# Patient Record
Sex: FEMALE | Race: WHITE | NOT HISPANIC OR LATINO | Employment: UNEMPLOYED | URBAN - METROPOLITAN AREA
[De-identification: names, ages, dates, MRNs, and addresses within clinical notes are randomized per-mention and may not be internally consistent; named-entity substitution may affect disease eponyms.]

---

## 2023-10-19 ENCOUNTER — HOSPITAL ENCOUNTER (OUTPATIENT)
Dept: RADIOLOGY | Facility: HOSPITAL | Age: 83
Discharge: HOME | End: 2023-10-19
Payer: MEDICARE

## 2023-10-19 DIAGNOSIS — R05.1 ACUTE COUGH: ICD-10-CM

## 2023-10-19 DIAGNOSIS — J47.0 BRONCHIECTASIS WITH ACUTE LOWER RESPIRATORY INFECTION (MULTI): ICD-10-CM

## 2023-10-19 PROCEDURE — 71046 X-RAY EXAM CHEST 2 VIEWS: CPT | Performed by: STUDENT IN AN ORGANIZED HEALTH CARE EDUCATION/TRAINING PROGRAM

## 2023-10-19 PROCEDURE — 71046 X-RAY EXAM CHEST 2 VIEWS: CPT | Mod: FY

## 2024-06-19 ENCOUNTER — APPOINTMENT (OUTPATIENT)
Dept: RADIOLOGY | Facility: HOSPITAL | Age: 84
End: 2024-06-19
Payer: MEDICARE

## 2024-06-19 ENCOUNTER — HOSPITAL ENCOUNTER (EMERGENCY)
Facility: HOSPITAL | Age: 84
Discharge: HOME | End: 2024-06-19
Payer: MEDICARE

## 2024-06-19 VITALS
HEIGHT: 62 IN | SYSTOLIC BLOOD PRESSURE: 155 MMHG | WEIGHT: 135 LBS | HEART RATE: 78 BPM | BODY MASS INDEX: 24.84 KG/M2 | TEMPERATURE: 97.5 F | DIASTOLIC BLOOD PRESSURE: 81 MMHG | OXYGEN SATURATION: 100 % | RESPIRATION RATE: 16 BRPM

## 2024-06-19 DIAGNOSIS — S49.91XA INJURY OF RIGHT SHOULDER, INITIAL ENCOUNTER: Primary | ICD-10-CM

## 2024-06-19 DIAGNOSIS — S00.83XA CONTUSION OF CHIN, INITIAL ENCOUNTER: ICD-10-CM

## 2024-06-19 DIAGNOSIS — S40.021A CONTUSION OF RIGHT UPPER ARM, INITIAL ENCOUNTER: ICD-10-CM

## 2024-06-19 PROCEDURE — 72050 X-RAY EXAM NECK SPINE 4/5VWS: CPT | Performed by: RADIOLOGY

## 2024-06-19 PROCEDURE — 73080 X-RAY EXAM OF ELBOW: CPT | Mod: RT

## 2024-06-19 PROCEDURE — 72050 X-RAY EXAM NECK SPINE 4/5VWS: CPT

## 2024-06-19 PROCEDURE — 99284 EMERGENCY DEPT VISIT MOD MDM: CPT

## 2024-06-19 PROCEDURE — 73060 X-RAY EXAM OF HUMERUS: CPT | Mod: RT

## 2024-06-19 PROCEDURE — 73030 X-RAY EXAM OF SHOULDER: CPT | Mod: RT

## 2024-06-19 PROCEDURE — 70110 X-RAY EXAM OF JAW 4/> VIEWS: CPT

## 2024-06-19 PROCEDURE — 2500000001 HC RX 250 WO HCPCS SELF ADMINISTERED DRUGS (ALT 637 FOR MEDICARE OP): Performed by: NURSE PRACTITIONER

## 2024-06-19 RX ORDER — CYCLOBENZAPRINE HCL 10 MG
5 TABLET ORAL ONCE
Status: COMPLETED | OUTPATIENT
Start: 2024-06-19 | End: 2024-06-19

## 2024-06-19 RX ORDER — TIZANIDINE 2 MG/1
2 TABLET ORAL EVERY 6 HOURS PRN
Qty: 30 TABLET | Refills: 0 | Status: SHIPPED | OUTPATIENT
Start: 2024-06-19 | End: 2024-06-29

## 2024-06-19 RX ORDER — NAPROXEN 250 MG/1
500 TABLET ORAL ONCE
Status: COMPLETED | OUTPATIENT
Start: 2024-06-19 | End: 2024-06-19

## 2024-06-19 RX ORDER — NAPROXEN 500 MG/1
500 TABLET ORAL
Qty: 20 TABLET | Refills: 0 | Status: SHIPPED | OUTPATIENT
Start: 2024-06-19 | End: 2024-06-29

## 2024-06-19 ASSESSMENT — PAIN DESCRIPTION - LOCATION: LOCATION: SHOULDER

## 2024-06-19 ASSESSMENT — LIFESTYLE VARIABLES
HAVE YOU EVER FELT YOU SHOULD CUT DOWN ON YOUR DRINKING: NO
TOTAL SCORE: 0
EVER HAD A DRINK FIRST THING IN THE MORNING TO STEADY YOUR NERVES TO GET RID OF A HANGOVER: NO
EVER FELT BAD OR GUILTY ABOUT YOUR DRINKING: NO
HAVE PEOPLE ANNOYED YOU BY CRITICIZING YOUR DRINKING: NO

## 2024-06-19 ASSESSMENT — PAIN SCALES - GENERAL
PAINLEVEL_OUTOF10: 2
PAINLEVEL_OUTOF10: 7
PAINLEVEL_OUTOF10: 4

## 2024-06-19 ASSESSMENT — PAIN - FUNCTIONAL ASSESSMENT
PAIN_FUNCTIONAL_ASSESSMENT: 0-10

## 2024-06-19 ASSESSMENT — PAIN DESCRIPTION - ORIENTATION: ORIENTATION: RIGHT

## 2024-06-19 ASSESSMENT — PAIN DESCRIPTION - PROGRESSION: CLINICAL_PROGRESSION: GRADUALLY IMPROVING

## 2024-06-19 ASSESSMENT — COLUMBIA-SUICIDE SEVERITY RATING SCALE - C-SSRS
6. HAVE YOU EVER DONE ANYTHING, STARTED TO DO ANYTHING, OR PREPARED TO DO ANYTHING TO END YOUR LIFE?: NO
1. IN THE PAST MONTH, HAVE YOU WISHED YOU WERE DEAD OR WISHED YOU COULD GO TO SLEEP AND NOT WAKE UP?: NO
2. HAVE YOU ACTUALLY HAD ANY THOUGHTS OF KILLING YOURSELF?: NO

## 2024-06-19 ASSESSMENT — PAIN DESCRIPTION - PAIN TYPE: TYPE: ACUTE PAIN

## 2024-06-19 NOTE — ED TRIAGE NOTES
Patient here with arm injury Tripped and hurt her right shoulder. States she also hit her chin, no LOC no thinners

## 2024-06-19 NOTE — ED PROVIDER NOTES
HPI   Chief Complaint   Patient presents with    Arm Injury     Tripped and hurt her right shoulder. States she also hit her chin, no LOC no thinners        84-year-old female presents today with right shoulder and humerus injury that occurred when she missed a step and struck the curb.  She rates her pain 10 out of 10.  She took an Advil with some relief.  She did not strike her head but she did strike her chin.  She is rating her chin pain 3 out of 10 but she has full range of motion of trend.  She she notes that she probably is not on any medication.  Her vital signs are normal and stable in triage.  She denies vision change.  She denies headache.  She denies posterior neck pain.  She denies chest pain or dyspnea.  She denies abdominal pain, nausea, or vomiting.  Her pain is specifically located in the humerus, shoulder, and slight pain on elbow during palpation.  She has no other cause for concern or complaint at this time.  She denies hip, thoracic, or lumbar pain.  She denies abdominal pain, nausea, or vomiting.  She denies confusion.      History provided by:  Patient and relative   used: No                        Aleksey Coma Scale Score: 15                     Patient History   No past medical history on file.  No past surgical history on file.  No family history on file.  Social History     Tobacco Use    Smoking status: Never    Smokeless tobacco: Never   Substance Use Topics    Alcohol use: Never    Drug use: Never       Physical Exam   ED Triage Vitals [06/19/24 1523]   Temperature Heart Rate Respirations BP   36.4 °C (97.5 °F) 89 18 127/64      Pulse Ox Temp Source Heart Rate Source Patient Position   95 % Skin Monitor Sitting      BP Location FiO2 (%)     Right arm --       Physical Exam  Constitutional:       Appearance: Normal appearance.   HENT:      Head: Normocephalic and atraumatic.      Right Ear: Tympanic membrane normal.      Left Ear: Tympanic membrane normal.      Nose:  Nose normal.      Mouth/Throat:      Mouth: Mucous membranes are moist.   Eyes:      Extraocular Movements: Extraocular movements intact.      Pupils: Pupils are equal, round, and reactive to light.   Cardiovascular:      Rate and Rhythm: Normal rate and regular rhythm.      Pulses: Normal pulses.      Heart sounds: Normal heart sounds.   Pulmonary:      Effort: Pulmonary effort is normal.      Breath sounds: Normal breath sounds.   Abdominal:      General: Abdomen is flat.      Palpations: Abdomen is soft.   Musculoskeletal:         General: Tenderness present. Normal range of motion.      Cervical back: Normal range of motion and neck supple.      Comments: Slight tenderness to the right shoulder but she can supinate and pronate without any pain to the wrist or hand.   Skin:     General: Skin is warm.      Capillary Refill: Capillary refill takes less than 2 seconds.   Neurological:      General: No focal deficit present.      Mental Status: She is alert and oriented to person, place, and time.   Psychiatric:         Mood and Affect: Mood normal.         Behavior: Behavior normal.         ED Course & MDM   Diagnoses as of 06/19/24 1716   Injury of right shoulder, initial encounter   Contusion of right upper arm, initial encounter   Contusion of chin, initial encounter       Medical Decision Making  X-ray was ordered of mandible, cervical, right shoulder humerus and elbow.  I did not feel she required wrist or hand as there was no pain elicited during exam.  Remaining physical exam was normal.  She received low-dose cyclobenzaprine 5 mg and 500 Naprosyn.  Imaging was negative for fracture mandible, shoulder, humerus, elbow.  It was negative for fracture and cervical spine.  She responded well to low-dose cyclobenzaprine she was in our ER.  At this time appointment with orthopedic surgery for follow-up.  Patient Dr. Percy Brunson contact information.  She went home on low-dose tizanidine 2 mg up and Naprosyn return  precautions reviewed.  If she develops acute nausea or vomiting, or regarding of her life, 9 1, and then the EEG.              Procedure  Procedures     Christiano Vigil, DIANA-CNP  06/19/24 5384

## 2024-08-21 ENCOUNTER — APPOINTMENT (OUTPATIENT)
Dept: RADIOLOGY | Facility: HOSPITAL | Age: 84
End: 2024-08-21
Payer: MEDICARE

## 2024-08-21 ENCOUNTER — HOSPITAL ENCOUNTER (EMERGENCY)
Facility: HOSPITAL | Age: 84
Discharge: HOME | End: 2024-08-21
Attending: STUDENT IN AN ORGANIZED HEALTH CARE EDUCATION/TRAINING PROGRAM
Payer: MEDICARE

## 2024-08-21 VITALS
BODY MASS INDEX: 24.84 KG/M2 | DIASTOLIC BLOOD PRESSURE: 76 MMHG | OXYGEN SATURATION: 99 % | SYSTOLIC BLOOD PRESSURE: 168 MMHG | HEIGHT: 62 IN | TEMPERATURE: 97.3 F | HEART RATE: 80 BPM | RESPIRATION RATE: 18 BRPM | WEIGHT: 135 LBS

## 2024-08-21 DIAGNOSIS — M25.552 LEFT HIP PAIN: Primary | ICD-10-CM

## 2024-08-21 LAB
ANION GAP SERPL CALC-SCNC: 12 MMOL/L (ref 10–20)
BASOPHILS # BLD AUTO: 0.03 X10*3/UL (ref 0–0.1)
BASOPHILS NFR BLD AUTO: 0.6 %
BUN SERPL-MCNC: 11 MG/DL (ref 6–23)
CALCIUM SERPL-MCNC: 9.6 MG/DL (ref 8.6–10.3)
CHLORIDE SERPL-SCNC: 102 MMOL/L (ref 98–107)
CO2 SERPL-SCNC: 29 MMOL/L (ref 21–32)
CREAT SERPL-MCNC: 0.67 MG/DL (ref 0.5–1.05)
EGFRCR SERPLBLD CKD-EPI 2021: 86 ML/MIN/1.73M*2
EOSINOPHIL # BLD AUTO: 0.22 X10*3/UL (ref 0–0.4)
EOSINOPHIL NFR BLD AUTO: 4.2 %
ERYTHROCYTE [DISTWIDTH] IN BLOOD BY AUTOMATED COUNT: 13.2 % (ref 11.5–14.5)
GLUCOSE SERPL-MCNC: 88 MG/DL (ref 74–99)
HCT VFR BLD AUTO: 46.8 % (ref 36–46)
HGB BLD-MCNC: 15.6 G/DL (ref 12–16)
IMM GRANULOCYTES # BLD AUTO: 0.02 X10*3/UL (ref 0–0.5)
IMM GRANULOCYTES NFR BLD AUTO: 0.4 % (ref 0–0.9)
LYMPHOCYTES # BLD AUTO: 1.27 X10*3/UL (ref 0.8–3)
LYMPHOCYTES NFR BLD AUTO: 24.4 %
MCH RBC QN AUTO: 31.3 PG (ref 26–34)
MCHC RBC AUTO-ENTMCNC: 33.3 G/DL (ref 32–36)
MCV RBC AUTO: 94 FL (ref 80–100)
MONOCYTES # BLD AUTO: 0.72 X10*3/UL (ref 0.05–0.8)
MONOCYTES NFR BLD AUTO: 13.8 %
NEUTROPHILS # BLD AUTO: 2.95 X10*3/UL (ref 1.6–5.5)
NEUTROPHILS NFR BLD AUTO: 56.6 %
NRBC BLD-RTO: 0 /100 WBCS (ref 0–0)
PLATELET # BLD AUTO: 276 X10*3/UL (ref 150–450)
POTASSIUM SERPL-SCNC: 4.5 MMOL/L (ref 3.5–5.3)
RBC # BLD AUTO: 4.98 X10*6/UL (ref 4–5.2)
SODIUM SERPL-SCNC: 138 MMOL/L (ref 136–145)
WBC # BLD AUTO: 5.2 X10*3/UL (ref 4.4–11.3)

## 2024-08-21 PROCEDURE — 2500000004 HC RX 250 GENERAL PHARMACY W/ HCPCS (ALT 636 FOR OP/ED): Performed by: STUDENT IN AN ORGANIZED HEALTH CARE EDUCATION/TRAINING PROGRAM

## 2024-08-21 PROCEDURE — 72170 X-RAY EXAM OF PELVIS: CPT

## 2024-08-21 PROCEDURE — 73552 X-RAY EXAM OF FEMUR 2/>: CPT | Mod: LT

## 2024-08-21 PROCEDURE — 96372 THER/PROPH/DIAG INJ SC/IM: CPT | Performed by: STUDENT IN AN ORGANIZED HEALTH CARE EDUCATION/TRAINING PROGRAM

## 2024-08-21 PROCEDURE — 99284 EMERGENCY DEPT VISIT MOD MDM: CPT

## 2024-08-21 PROCEDURE — 72170 X-RAY EXAM OF PELVIS: CPT | Mod: LEFT SIDE | Performed by: RADIOLOGY

## 2024-08-21 PROCEDURE — 36415 COLL VENOUS BLD VENIPUNCTURE: CPT | Performed by: STUDENT IN AN ORGANIZED HEALTH CARE EDUCATION/TRAINING PROGRAM

## 2024-08-21 PROCEDURE — 85025 COMPLETE CBC W/AUTO DIFF WBC: CPT | Performed by: STUDENT IN AN ORGANIZED HEALTH CARE EDUCATION/TRAINING PROGRAM

## 2024-08-21 PROCEDURE — 73552 X-RAY EXAM OF FEMUR 2/>: CPT | Mod: LEFT SIDE | Performed by: RADIOLOGY

## 2024-08-21 PROCEDURE — 80048 BASIC METABOLIC PNL TOTAL CA: CPT | Performed by: STUDENT IN AN ORGANIZED HEALTH CARE EDUCATION/TRAINING PROGRAM

## 2024-08-21 PROCEDURE — 2500000005 HC RX 250 GENERAL PHARMACY W/O HCPCS: Performed by: STUDENT IN AN ORGANIZED HEALTH CARE EDUCATION/TRAINING PROGRAM

## 2024-08-21 RX ORDER — ACETAMINOPHEN 500 MG
1000 TABLET ORAL EVERY 6 HOURS PRN
Qty: 100 TABLET | Refills: 0 | Status: SHIPPED | OUTPATIENT
Start: 2024-08-21

## 2024-08-21 RX ORDER — LIDOCAINE 560 MG/1
1 PATCH PERCUTANEOUS; TOPICAL; TRANSDERMAL ONCE
Status: DISCONTINUED | OUTPATIENT
Start: 2024-08-21 | End: 2024-08-21 | Stop reason: HOSPADM

## 2024-08-21 RX ORDER — IBUPROFEN 200 MG
400 TABLET ORAL EVERY 6 HOURS PRN
Qty: 100 TABLET | Refills: 0 | Status: SHIPPED | OUTPATIENT
Start: 2024-08-21

## 2024-08-21 RX ORDER — ACETAMINOPHEN 325 MG/1
975 TABLET ORAL ONCE
Status: COMPLETED | OUTPATIENT
Start: 2024-08-21 | End: 2024-08-21

## 2024-08-21 RX ORDER — KETOROLAC TROMETHAMINE 15 MG/ML
15 INJECTION, SOLUTION INTRAMUSCULAR; INTRAVENOUS ONCE
Status: COMPLETED | OUTPATIENT
Start: 2024-08-21 | End: 2024-08-21

## 2024-08-21 ASSESSMENT — PAIN - FUNCTIONAL ASSESSMENT: PAIN_FUNCTIONAL_ASSESSMENT: 0-10

## 2024-08-21 ASSESSMENT — COLUMBIA-SUICIDE SEVERITY RATING SCALE - C-SSRS
1. IN THE PAST MONTH, HAVE YOU WISHED YOU WERE DEAD OR WISHED YOU COULD GO TO SLEEP AND NOT WAKE UP?: NO
2. HAVE YOU ACTUALLY HAD ANY THOUGHTS OF KILLING YOURSELF?: NO
6. HAVE YOU EVER DONE ANYTHING, STARTED TO DO ANYTHING, OR PREPARED TO DO ANYTHING TO END YOUR LIFE?: NO

## 2024-08-21 ASSESSMENT — PAIN SCALES - GENERAL: PAINLEVEL_OUTOF10: 10 - WORST POSSIBLE PAIN

## 2024-08-21 ASSESSMENT — ACTIVITIES OF DAILY LIVING (ADL): LACK_OF_TRANSPORTATION: NO

## 2024-08-21 NOTE — DISCHARGE INSTRUCTIONS
For pain control, you can take ibuprofen 400 mg up to every 6 hours, and Tylenol 1000 mg up to every 6 hours as well.  You can alternate each every 3 hours for best results.    Return to the ED with worsening pain, difficulty walking, any falls, or any weakness or numbness in your legs.  Follow-up with orthopedic surgery.

## 2024-08-21 NOTE — ED PROVIDER NOTES
Emergency Department Provider Note        History of Present Illness     History provided by: Patient  Limitations to History: None  External Records Reviewed with Brief Summary: Outpatient progress note from 8/16 which showed Dr. Ardon in clinic, right shoulder pain, got Methylpred injection into the right shoulder.    HPI:  Loan Bella is a 84 y.o. female presenting to the emergency department with left hip pain.  She is otherwise fairly healthy, had a fall on June 19 resulting in a right shoulder injury.  She saw orthopedic surgery on 8/16 for her right shoulder injury, but shortly after receiving a steroid injection in the right shoulder, she began developing left hip pain.  She reports no further trauma or falls.  She reports stabbing pain in the posterior/lateral left hip, nonradiating, not relieved with rest.  She has not taken anything else for the pain.  Denies any fevers or chills.  Has been able to bear weight.    Physical Exam   Triage vitals:  T 36.2 °C (97.2 °F)  HR 87  /83  RR 18  O2 99 % None (Room air)    General: Awake, alert, in no acute distress  Eyes: Gaze conjugate.  No scleral icterus or injection  HENT: Normo-cephalic, atraumatic. No stridor  CV: Regular rate, regular rhythm. Radial pulses 2+ bilaterally  MSK/Extremities: No gross bony deformities. Moving all extremities.  Strength slightly limited hip flexion due to pain, full extension, intact internal/external rotation of the hip without pain or clicks.  Intact range of motion of the knee and ankle and left lower extremity.  5/5 hip flexion, extension, knee flexion, extension, plantar and dorsiflexion.  Sensation to touch intact in the left lower extremity.  2+ DP pulse in the left lower extremity.  No deformity, swelling noted to the hip, no warmth, no erythema.  No midline back pain  Skin: Warm. Appropriate color  Neuro: Alert. Oriented. Face symmetric. Speech is fluent.  Gross strength and sensation intact in b/l UE and  LEs  Psych: Appropriate mood and affect    Medical Decision Making & ED Course   Medical Decision Makin y.o. female presenting to the emergency department with left hip pain which is atraumatic.  On arrival, vital signs within normal limits patient exam reassuring, range of motion is intact, not limited so suspicion for septic joint is fairly low.  Unclear as she developed hip pain shortly following steroid injection of the right shoulder however I think these are unrelated.  She denies any recent falls or trauma, most recent fall was in  when she was seen in this ED, and she endorses having no left hip pain at that time.  Will start workup with x-ray, basic blood work.  Will consider CT if she is unable to bear weight and pain is unable to be controlled with simple multimodal pain control.    After simple, multimodal pain control, patient is able to tolerate and bear weight without difficulty.  Still endorsing mild soreness.  Blood work unremarkable, low concern for septic arthritis given ability to bear weight, ability to range hip, and normal white blood cell count.  Given her lack of trauma, do not feel she requires a CT at this time.  Instead, we will place referral for orthopedic surgery encourage patient follow-up.  Patient agreeable to this plan of care, discharged in stable condition.  Given prescriptions for multimodal pain control to go home with.  ----      Differential diagnoses considered include but are not limited to: Septic, bursitis, knee fracture, radiographically occult fracture, sciatica, lumbar radiculopathy     Social Determinants of Health which Significantly Impact Care: None identified     EKG Independent Interpretation: EKG not obtained    Chronic conditions affecting the patient's care: None    The patient was discussed with the following consultants/services: None    Care Considerations: As documented above in [facility]    ED Course:  Diagnoses as of 24 1157   Left hip pain      Disposition   As a result of the work-up, the patient was discharged home.  she was informed of her diagnosis and instructed to come back with any concerns or worsening of condition.  she and was agreeable to the plan as discussed above.  she was given the opportunity to ask questions.  All of the patient's questions were answered.    Procedures   Procedures        Gilbert Fischer MD  Emergency Medicine     Gilbert Fischer MD  08/22/24 2697     1 person assist

## 2024-08-21 NOTE — ED TRIAGE NOTES
Pt presents via POV from home for left hip pain. Pt states she fell on June 19, states she had outpatient Xrays done. She saw Dr. rAdon on Friday who gave her a cortisone shot to her left hip. Pt states the pain became sever aprox 2 days after the cortisone shot. States she is able to walk, but has severe pain.

## 2024-08-21 NOTE — PROGRESS NOTES
08/21/24 0908   Discharge Planning   Living Arrangements Spouse/significant other;Children;Family members  (Home with spouse, daughter, son-in-law and grandson)   Support Systems Spouse/significant other;Children;Family members   Assistance Needed A&OX3; very very independent with no DME; drives; room air baseline and currently room air   Type of Residence Private residence   Number of Stairs to Enter Residence 0   Number of Stairs Within Residence 14   Do you have animals or pets at home? Yes   Type of Animals or Pets mariana has 2 dogs & 1 cat in home   Who is requesting discharge planning? Provider   Home or Post Acute Services None   Expected Discharge Disposition Home  (Patient denies any home going needs)   Does the patient need discharge transport arranged? No   Financial Resource Strain   How hard is it for you to pay for the very basics like food, housing, medical care, and heating? Not hard   Housing Stability   In the last 12 months, was there a time when you were not able to pay the mortgage or rent on time? N   In the past 12 months, how many times have you moved where you were living? 1   At any time in the past 12 months, were you homeless or living in a shelter (including now)? N   Transportation Needs   In the past 12 months, has lack of transportation kept you from medical appointments or from getting medications? no   In the past 12 months, has lack of transportation kept you from meetings, work, or from getting things needed for daily living? No     08/21/2024 0911am  Spoke with patient bedside in ED

## 2024-09-06 ENCOUNTER — APPOINTMENT (OUTPATIENT)
Dept: ORTHOPEDIC SURGERY | Facility: CLINIC | Age: 84
End: 2024-09-06
Payer: MEDICARE

## 2024-09-06 DIAGNOSIS — M70.62 GREATER TROCHANTERIC BURSITIS OF LEFT HIP: Primary | ICD-10-CM

## 2024-09-06 DIAGNOSIS — M25.552 LEFT HIP PAIN: ICD-10-CM

## 2024-09-06 PROCEDURE — 1157F ADVNC CARE PLAN IN RCRD: CPT | Performed by: ORTHOPAEDIC SURGERY

## 2024-09-06 PROCEDURE — 99204 OFFICE O/P NEW MOD 45 MIN: CPT | Performed by: ORTHOPAEDIC SURGERY

## 2024-09-06 PROCEDURE — 1036F TOBACCO NON-USER: CPT | Performed by: ORTHOPAEDIC SURGERY

## 2024-09-06 PROCEDURE — 20610 DRAIN/INJ JOINT/BURSA W/O US: CPT | Performed by: ORTHOPAEDIC SURGERY

## 2024-09-06 PROCEDURE — 1159F MED LIST DOCD IN RCRD: CPT | Performed by: ORTHOPAEDIC SURGERY

## 2024-09-06 PROCEDURE — 1160F RVW MEDS BY RX/DR IN RCRD: CPT | Performed by: ORTHOPAEDIC SURGERY

## 2024-09-06 RX ORDER — TRIAMCINOLONE ACETONIDE 40 MG/ML
1 INJECTION, SUSPENSION INTRA-ARTICULAR; INTRAMUSCULAR
Status: COMPLETED | OUTPATIENT
Start: 2024-09-06 | End: 2024-09-06

## 2024-09-06 NOTE — LETTER
September 6, 2024     Mirza Gary DO  [address] [facility] [address]    Patient: Loan Bella   YOB: 1940   Date of Visit: 9/6/2024       Dear Dr. Mirza Gary DO:    Thank you for referring Loan Bella to me for evaluation. Below are my notes for this consultation.  If you have questions, please do not hesitate to call me. I look forward to following your patient along with you.       Sincerely,     Brandon Newman MD      CC: Christiano Vigil, APRN-CNP  ______________________________________________________________________________________    This is a consultation from Dr. Mirza Gary DO for left hip pain      This is a 84 y.o. female who presents for evaluation of left hip pain.  Patient states she has had left hip pain for 2 to 3 weeks and is a new issue.  There was no inciting trauma or change in activity.  She planes of sharp pain over the lateral aspect of the hip, radiates into her lateral thigh.  No numbness no tingling no fevers no chills no shooting pain down the leg.  She is tried over-the-counter anti-inflammatories not helping much.  She has never had therapy or injections.    Physical Exam    There has been no interval change in this patient's past medical, surgical, medications, allergies, family history or social history since the most recent visit to a provider within our department. 14 point review of systems was performed, reviewed, and negative except for pertinent positives documented in the history of present illness.     Constitutional: well developed, well nourished female in no acute distress  Psychiatric: normal mood, appropriate affect  Eyes: sclera anicteric  HENT: normocephalic/atraumatic  CV: regular rate and rhythm   Respiratory: non labored breathing  Integumentary: no rash  Neurological: moves all extremities    Left hip exam: skin normal, no abrasions, wounds, or lacerations.  Tender over greater trochanter. negative log roll,  negative pamela's test. flexion to 90 degrees without pain. no pain with flexion abduction and external rotation, no pain with flexion adduction and internal rotation. neurovascularly intact distally        Xrays were ordered by me, they were reviewed and independently interpreted by me today, they show no significant degenerative changes no fracture dislocation left hip    L Inj/Asp: L greater trochanteric bursa on 9/6/2024 12:01 PM  Indications: pain  Details: 22 G (Spinal needle  ) needle, lateral approach  Medications: 1 mL triamcinolone acetonide 40 mg/mL      Greater Trochanteric bursitis cortisone injection procedure note:  Discussion:  I discussed the conservative treatment options for Greater Trochanteric Bursitis including but not limited to physical therapy, oral NSAIDS, activity and lifestyle modification, and corticosteroid injections. Pt has elected to try a cortisone injection today. I have explained the risk and benefits of an injection including the possibility of joint infection, bleeding, damage to cartilage, allergic reaction. Patient verbalized understanding and gave verbal consent wishes to proceed with a intraarticular cortisone injection for their peribursal area. We discussed the risks and benefits and potential morbidity related to the treatment, and to the prescription medication administered in the injection    Procedure    With the patient's informed verbal consent, the left hip greater trochanteric area was prepped in standard sterile fashion with Chlorhexidine in a sidelying position. The skin was then anesthetized with ethyl chloride spray and cleaned again with Chlorhexidine. The peritrochanteric soft tissue was then injected with a prefilled spinal needle syringe of 40 mg Kenalog +4 ml Lidocaine without complications.  The patient tolerated this well and felt immediate initial relief of symptoms. A bandaid was applied and the patient ambulated out of the clinic on ther own accord  "without difficulty. Patient should contact the office if any signs of of infection appear: redness, fever, chills, drainage, swelling or warmth to the hip.  Pt understands that the injections can be repeated no sooner than 3 months.    Procedure, treatment alternatives, risks and benefits explained, specific risks discussed. Consent was given by the patient. Immediately prior to procedure a time out was called to verify the correct patient, procedure, equipment, support staff and site/side marked as required. Patient was prepped and draped in the usual sterile fashion.             Impression/Plan: This is a 84 y.o. female with left hip greater trochanteric pain syndrome.  I discussed the risks and benefits of the various treatment options with the patient in detail, treatments for greater trochanteric pain syndrome include use of oral anti-inflammatory medications, use of a cane in the opposite hand, physical therapy, activity modification, and cortisone injection.  I will see her back as needed    BMI Readings from Last 1 Encounters:   08/21/24 24.69 kg/m²      Lab Results   Component Value Date    CREATININE 0.67 08/21/2024     Tobacco Use: Low Risk  (9/6/2024)    Patient History    • Smoking Tobacco Use: Never    • Smokeless Tobacco Use: Never    • Passive Exposure: Not on file      Computed MELD 3.0 unavailable. One or more values for this score either were not found within the given timeframe or did not fit some other criterion.  Computed MELD-Na unavailable. One or more values for this score either were not found within the given timeframe or did not fit some other criterion.       No results found for: \"HGBA1C\"  No results found for: \"STAPHMRSASCR\"  "

## 2024-09-06 NOTE — PROGRESS NOTES
This is a consultation from Dr. Mirza Gary DO for left hip pain      This is a 84 y.o. female who presents for evaluation of left hip pain.  Patient states she has had left hip pain for 2 to 3 weeks and is a new issue.  There was no inciting trauma or change in activity.  She planes of sharp pain over the lateral aspect of the hip, radiates into her lateral thigh.  No numbness no tingling no fevers no chills no shooting pain down the leg.  She is tried over-the-counter anti-inflammatories not helping much.  She has never had therapy or injections.    Physical Exam    There has been no interval change in this patient's past medical, surgical, medications, allergies, family history or social history since the most recent visit to a provider within our department. 14 point review of systems was performed, reviewed, and negative except for pertinent positives documented in the history of present illness.     Constitutional: well developed, well nourished female in no acute distress  Psychiatric: normal mood, appropriate affect  Eyes: sclera anicteric  HENT: normocephalic/atraumatic  CV: regular rate and rhythm   Respiratory: non labored breathing  Integumentary: no rash  Neurological: moves all extremities    Left hip exam: skin normal, no abrasions, wounds, or lacerations.  Tender over greater trochanter. negative log roll, negative pamela's test. flexion to 90 degrees without pain. no pain with flexion abduction and external rotation, no pain with flexion adduction and internal rotation. neurovascularly intact distally        Xrays were ordered by me, they were reviewed and independently interpreted by me today, they show no significant degenerative changes no fracture dislocation left hip    L Inj/Asp: L greater trochanteric bursa on 9/6/2024 12:01 PM  Indications: pain  Details: 22 G (Spinal needle  ) needle, lateral approach  Medications: 1 mL triamcinolone acetonide 40 mg/mL      Greater Trochanteric  bursitis cortisone injection procedure note:  Discussion:  I discussed the conservative treatment options for Greater Trochanteric Bursitis including but not limited to physical therapy, oral NSAIDS, activity and lifestyle modification, and corticosteroid injections. Pt has elected to try a cortisone injection today. I have explained the risk and benefits of an injection including the possibility of joint infection, bleeding, damage to cartilage, allergic reaction. Patient verbalized understanding and gave verbal consent wishes to proceed with a intraarticular cortisone injection for their peribursal area. We discussed the risks and benefits and potential morbidity related to the treatment, and to the prescription medication administered in the injection    Procedure    With the patient's informed verbal consent, the left hip greater trochanteric area was prepped in standard sterile fashion with Chlorhexidine in a sidelying position. The skin was then anesthetized with ethyl chloride spray and cleaned again with Chlorhexidine. The peritrochanteric soft tissue was then injected with a prefilled spinal needle syringe of 40 mg Kenalog +4 ml Lidocaine without complications.  The patient tolerated this well and felt immediate initial relief of symptoms. A bandaid was applied and the patient ambulated out of the clinic on ther own accord without difficulty. Patient should contact the office if any signs of of infection appear: redness, fever, chills, drainage, swelling or warmth to the hip.  Pt understands that the injections can be repeated no sooner than 3 months.    Procedure, treatment alternatives, risks and benefits explained, specific risks discussed. Consent was given by the patient. Immediately prior to procedure a time out was called to verify the correct patient, procedure, equipment, support staff and site/side marked as required. Patient was prepped and draped in the usual sterile fashion.  "            Impression/Plan: This is a 84 y.o. female with left hip greater trochanteric pain syndrome.  I discussed the risks and benefits of the various treatment options with the patient in detail, treatments for greater trochanteric pain syndrome include use of oral anti-inflammatory medications, use of a cane in the opposite hand, physical therapy, activity modification, and cortisone injection.  I will see her back as needed    BMI Readings from Last 1 Encounters:   08/21/24 24.69 kg/m²      Lab Results   Component Value Date    CREATININE 0.67 08/21/2024     Tobacco Use: Low Risk  (9/6/2024)    Patient History     Smoking Tobacco Use: Never     Smokeless Tobacco Use: Never     Passive Exposure: Not on file      Computed MELD 3.0 unavailable. One or more values for this score either were not found within the given timeframe or did not fit some other criterion.  Computed MELD-Na unavailable. One or more values for this score either were not found within the given timeframe or did not fit some other criterion.       No results found for: \"HGBA1C\"  No results found for: \"STAPHMRSASCR\"  "

## 2024-10-09 ENCOUNTER — LAB (OUTPATIENT)
Dept: LAB | Facility: LAB | Age: 84
End: 2024-10-09
Payer: MEDICARE

## 2024-10-11 ENCOUNTER — EVALUATION (OUTPATIENT)
Dept: PHYSICAL THERAPY | Facility: CLINIC | Age: 84
End: 2024-10-11
Payer: MEDICARE

## 2024-10-11 DIAGNOSIS — M25.552 PAIN OF LEFT HIP: Primary | ICD-10-CM

## 2024-10-11 DIAGNOSIS — M70.62 GREATER TROCHANTERIC BURSITIS OF LEFT HIP: ICD-10-CM

## 2024-10-11 DIAGNOSIS — R29.898 WEAKNESS OF LEFT HIP: ICD-10-CM

## 2024-10-11 PROBLEM — M25.559 HIP PAIN: Status: ACTIVE | Noted: 2024-10-11

## 2024-10-11 PROCEDURE — 97161 PT EVAL LOW COMPLEX 20 MIN: CPT | Mod: GP | Performed by: PHYSICAL THERAPIST

## 2024-10-11 PROCEDURE — 97110 THERAPEUTIC EXERCISES: CPT | Mod: GP | Performed by: PHYSICAL THERAPIST

## 2024-10-11 ASSESSMENT — ENCOUNTER SYMPTOMS
OCCASIONAL FEELINGS OF UNSTEADINESS: 0
LOSS OF SENSATION IN FEET: 0
DEPRESSION: 0

## 2024-10-11 ASSESSMENT — PATIENT HEALTH QUESTIONNAIRE - PHQ9
2. FEELING DOWN, DEPRESSED OR HOPELESS: NOT AT ALL
SUM OF ALL RESPONSES TO PHQ9 QUESTIONS 1 AND 2: 0
1. LITTLE INTEREST OR PLEASURE IN DOING THINGS: NOT AT ALL

## 2024-10-11 ASSESSMENT — ACTIVITIES OF DAILY LIVING (ADL): ADL_ASSISTANCE: INDEPENDENT

## 2024-10-11 NOTE — PROGRESS NOTES
Physical Therapy  Physical Therapy Evaluation  Patient Name: Loan Bella  MRN: [id number]  Today's Date: 10/11/2024    Time Calculation  Start Time: 1250  Stop Time: 1329  Time Calculation (min): 39 min    General  Reason for Referral: Hip pain  Referred By: Dr. Newman  Past Medical History Relevant to Rehab: none  Family/Caregiver Present: No  General Comment: Onset Date 9/6/24, [place] Medicare Auth required   1. Pain of left hip        2. Greater trochanteric bursitis of left hip  Referral to Physical Therapy    Follow Up In Physical Therapy      3. Weakness of left hip            Precautions  STEADI Fall Risk Score (The score of 4 or more indicates an increased risk of falling): 1  Precautions Comment: None        Assessment:  Patient is a 84 year old who presents to Physical therapy secondary to L hip pain. Upon PT evaluation the patient is presenting with the following deficits: decreased L hip strength, ROM, and gait deficits.   The above deficits are limiting patient's ability to care for her .  Secondary to the above deficits the patient will benefit from skilled PT intervention to allow the patient to progress to the goal of being able to assist with ADL's and transferring her .  PT will monitor progress towards goals and adjust intervention as appropriate.        PT Assessment  PT Assessment Results: Decreased strength, Decreased range of motion, Decreased mobility, Pain  Barriers to Participation:  (poor historian)    Medical History Form: Reviewed and scanned into chart     Plan:  OP PT Plan  Treatment/Interventions: Cryotherapy, Education/ Instruction, Electrical stimulation, Gait training, Hot pack, Manual therapy, Therapeutic activities, Therapeutic exercises  PT Plan: Skilled PT  PT Frequency: 2 times per week  Duration: 6 weeks  Rehab Potential: Good  Plan of Care Agreement: Patient  Plan of care discussed with patient and patient agrees with plan      Subjective:    Patient  did have a prior fall Loan she had a fall and landed on her R arm  and began to feel pain in her R hip.  Had an injection in L hip and the injection pain has improved some but is still having pain  in L glut radiating to posterior thigh to mid hamstring.  Patient has to physically lift .      Pain     Pain aggravating Factors: Lifting, walking, stairs  Pain relieving factors: Advil every 3 hours.    Pain does fluctuate.   At rest pain 5/10, at worst 10/10 in AM   Red Flags: Do you have any of the following? No   Fever/chills, unexplained weight changes, dizziness/fainting, unexplained change in bowel or bladder functions, unexplained malaise or muscle weakness, night pain/sweats, numbness or tingling, history of CA.  Previous Interventions/Treatments:  Injection by Dr. Newman 9/6/24  Prior Testing: Xray of pelvis and spine     Prior Function Per Pt/Caregiver Report  Level of [place]: Independent with ADLs and functional transfers  ADL Assistance: Independent  Vocational: Retired  Prior Function Comments: Patient cares for    Home Living  Home Living Comment: Stairs inside home but patient remains on level surface  Primary Language: English  Patient's goal for therapy: To decrease pain to care for        Objective:     Hip Functional Rating Scale  LEFS   /80: 42     Hip Palpation/Joint Mobility Assessment  Palpation / Joint Mobility Comment: Tenderness L ASIS, greater trochanter, L illiac crest, tenderness along L IT band     Hip AROM  R hip flexion: (125°): 100  L hip flexion: (125°): 90  R hip abduction: (45°): 40  L hip abduction: (45°): 40  R hip ER: (45°): 40  L hip ER: (45°): 40  R hip IR: (45°): 35  L hip IR: (45°): 32  Hip PROM  R hip flexion: (125°): 115  L hip flexion: (125°): 110 aching in hamstring  Specific Lower Extremity MMT  R Iliopsoas: (5/5): 4-  L Iliopsoas: (5/5): 3+  R Gluteals (prone): (5/5): 3  L Gluteals (prone): (5/5): 3  R Gluteals (sidelying): (5/5): 4  L Gluteals  (sidelying): (5/5): 4-  R knee flexion: (5/5): 4  L knee flexion: (5/5): 4  R knee extension: (5/5): 4  L knee extension: (5/5): 4     Special Tests  Supine SLR: (Negative): Negative  Lester Test: (Negative): Negative  ALBERT: (Negative): Negative  Gait  Gait Comment: Antalgic gait with decreased weight on L , Poor eccentric control when descending steps wiht heavy reliance on stairs  Flexibility  R hamstrings: Fair  L hamstrings: Fair  R hip flexors: Fair (10 degrees)  L hip flexors: Fair (10 degrees)    90 90 hamstring flexibility:  L 25   R 25    Treatment:   Low  Complexity Eval: 30 minutes    Education:  Outpatient Education  Individual(s) Educated: Patient  Education Provided: Home Exercise Program, POC  Risk and Benefits Discussed with Patient/Caregiver/Other: yes  Patient/Caregiver Demonstrated Understanding: yes  Plan of Care Discussed and Agreed Upon: yes  Patient Response to Education: Patient/Caregiver Verbalized Understanding of Information, Patient/Caregiver Performed Return Demonstration of Exercises/Activities  Therapeutic Exercise:   9 MINUTES  Therapeutic exercises completed this date to improve strength and postural control to improve ability to perform functional activities safely.      Access Code: [email or web address]  URL: [email or web address]  Date: 10/11/2024  Prepared by: Fara Lopez    Exercises  - Supine Figure 4 Piriformis Stretch  - 2 x daily - 7 x weekly - 3 sets - 30 seconds hold  - Bent Knee Fallouts  - 2 x daily - 7 x weekly - 2 sets - 10 reps  - Supine Active Straight Leg Raise  - 2 x daily - 7 x weekly - 2 sets - 10 reps    Goals:  Goals were developed with patient input   Active       PT Problem       Patient to be able to walk on level surfaces x 600 feet without pain to allow for patient to walk in grocery store        Start:  10/11/24    Expected End:  11/22/24            Patient will report pain 1/10 at worst in L hip with daily activities to allow for ability to  care for        Start:  10/11/24    Expected End:  11/22/24            Patient will demonstrate improved L LE strength to 4+/5 to allow for improved stability during lifting activities including transferring /assisting him with ADLs       Start:  10/11/24    Expected End:  11/22/24            Patient will demonstrate improved score on LEFS by 10 points to demonstrate improved subjective functional mobility and ability to perform daily activities.          Start:  10/11/24    Expected End:  11/22/24            Patient will demonstrate independence and compliance with safe and appropriate HEP for pain reduction, ROM/flexibility, core and BLE strength and postural correction.        Start:  10/11/24    Expected End:  11/22/24              Fara Lopez, PT

## 2024-10-16 ENCOUNTER — LAB (OUTPATIENT)
Dept: LAB | Facility: LAB | Age: 84
End: 2024-10-16
Payer: MEDICARE

## 2024-10-16 DIAGNOSIS — D64.9 ANEMIA, UNSPECIFIED: ICD-10-CM

## 2024-10-16 DIAGNOSIS — R53.83 OTHER FATIGUE: Primary | ICD-10-CM

## 2024-10-16 DIAGNOSIS — E55.9 VITAMIN D DEFICIENCY, UNSPECIFIED: ICD-10-CM

## 2024-10-16 DIAGNOSIS — E03.9 HYPOTHYROIDISM, UNSPECIFIED: ICD-10-CM

## 2024-10-16 LAB
25(OH)D3 SERPL-MCNC: 12 NG/ML (ref 30–100)
ALBUMIN SERPL BCP-MCNC: 4 G/DL (ref 3.4–5)
ALP SERPL-CCNC: 55 U/L (ref 33–136)
ALT SERPL W P-5'-P-CCNC: 14 U/L (ref 7–45)
ANION GAP SERPL CALC-SCNC: 9 MMOL/L (ref 10–20)
AST SERPL W P-5'-P-CCNC: 23 U/L (ref 9–39)
BASOPHILS # BLD AUTO: 0.05 X10*3/UL (ref 0–0.1)
BASOPHILS NFR BLD AUTO: 1.1 %
BILIRUB SERPL-MCNC: 0.6 MG/DL (ref 0–1.2)
BUN SERPL-MCNC: 12 MG/DL (ref 6–23)
CALCIUM SERPL-MCNC: 9.1 MG/DL (ref 8.6–10.3)
CHLORIDE SERPL-SCNC: 104 MMOL/L (ref 98–107)
CO2 SERPL-SCNC: 33 MMOL/L (ref 21–32)
CREAT SERPL-MCNC: 0.64 MG/DL (ref 0.5–1.05)
EGFRCR SERPLBLD CKD-EPI 2021: 87 ML/MIN/1.73M*2
EOSINOPHIL # BLD AUTO: 0.19 X10*3/UL (ref 0–0.4)
EOSINOPHIL NFR BLD AUTO: 4.3 %
ERYTHROCYTE [DISTWIDTH] IN BLOOD BY AUTOMATED COUNT: 13.7 % (ref 11.5–14.5)
FOLATE SERPL-MCNC: 15 NG/ML
GLUCOSE SERPL-MCNC: 87 MG/DL (ref 74–99)
HCT VFR BLD AUTO: 38.5 % (ref 36–46)
HGB BLD-MCNC: 12.9 G/DL (ref 12–16)
IMM GRANULOCYTES # BLD AUTO: 0.01 X10*3/UL (ref 0–0.5)
IMM GRANULOCYTES NFR BLD AUTO: 0.2 % (ref 0–0.9)
IRON SATN MFR SERPL: 47 % (ref 25–45)
IRON SERPL-MCNC: 133 UG/DL (ref 35–150)
LYMPHOCYTES # BLD AUTO: 1.27 X10*3/UL (ref 0.8–3)
LYMPHOCYTES NFR BLD AUTO: 28.7 %
MCH RBC QN AUTO: 31.9 PG (ref 26–34)
MCHC RBC AUTO-ENTMCNC: 33.5 G/DL (ref 32–36)
MCV RBC AUTO: 95 FL (ref 80–100)
MONOCYTES # BLD AUTO: 0.53 X10*3/UL (ref 0.05–0.8)
MONOCYTES NFR BLD AUTO: 12 %
NEUTROPHILS # BLD AUTO: 2.37 X10*3/UL (ref 1.6–5.5)
NEUTROPHILS NFR BLD AUTO: 53.7 %
NRBC BLD-RTO: 0 /100 WBCS (ref 0–0)
PLATELET # BLD AUTO: 294 X10*3/UL (ref 150–450)
POTASSIUM SERPL-SCNC: 3.8 MMOL/L (ref 3.5–5.3)
PROT SERPL-MCNC: 6.4 G/DL (ref 6.4–8.2)
RBC # BLD AUTO: 4.05 X10*6/UL (ref 4–5.2)
SODIUM SERPL-SCNC: 142 MMOL/L (ref 136–145)
TIBC SERPL-MCNC: 282 UG/DL (ref 240–445)
TSH SERPL-ACNC: 1.94 MIU/L (ref 0.44–3.98)
UIBC SERPL-MCNC: 149 UG/DL (ref 110–370)
VIT B12 SERPL-MCNC: 248 PG/ML (ref 211–911)
WBC # BLD AUTO: 4.4 X10*3/UL (ref 4.4–11.3)

## 2024-10-16 PROCEDURE — 83550 IRON BINDING TEST: CPT

## 2024-10-16 PROCEDURE — 80053 COMPREHEN METABOLIC PANEL: CPT

## 2024-10-16 PROCEDURE — 85025 COMPLETE CBC W/AUTO DIFF WBC: CPT

## 2024-10-16 PROCEDURE — 82306 VITAMIN D 25 HYDROXY: CPT

## 2024-10-16 PROCEDURE — 82607 VITAMIN B-12: CPT

## 2024-10-16 PROCEDURE — 83540 ASSAY OF IRON: CPT

## 2024-10-16 PROCEDURE — 36415 COLL VENOUS BLD VENIPUNCTURE: CPT

## 2024-10-16 PROCEDURE — 82746 ASSAY OF FOLIC ACID SERUM: CPT

## 2024-10-16 PROCEDURE — 84443 ASSAY THYROID STIM HORMONE: CPT

## 2024-10-17 ENCOUNTER — TREATMENT (OUTPATIENT)
Dept: PHYSICAL THERAPY | Facility: CLINIC | Age: 84
End: 2024-10-17
Payer: MEDICARE

## 2024-10-17 DIAGNOSIS — R29.898 WEAKNESS OF LEFT HIP: ICD-10-CM

## 2024-10-17 DIAGNOSIS — M70.62 GREATER TROCHANTERIC BURSITIS OF LEFT HIP: ICD-10-CM

## 2024-10-17 DIAGNOSIS — M25.552 PAIN OF LEFT HIP: Primary | ICD-10-CM

## 2024-10-17 PROCEDURE — 97110 THERAPEUTIC EXERCISES: CPT | Mod: GP | Performed by: PHYSICAL THERAPIST

## 2024-10-17 NOTE — PROGRESS NOTES
Physical Therapy    Physical Therapy Treatment    Patient Name: Loan Bella  MRN: [id number]  Today's Date: 10/17/2024    Time Entry:   Time Calculation  Start Time: 1104 (pt arrival time in PT clinic)  Stop Time: 1145  Time Calculation (min): 41 min     PT Therapeutic Procedures Time Entry  Therapeutic Exercise Time Entry: 38                   Assessment:  Pt reported decreased intensity of pain from 4/10 to 1/10 after today's treatment. Pt to continue current HEP as tolerated.  Pt will likely benefit from further PT intervention to address impairments noted on initial evaluation and to progress toward goals as tolerated.        Current Problem  1. Pain of left hip        2. Greater trochanteric bursitis of left hip  Follow Up In Physical Therapy      3. Weakness of left hip            General  Reason for Referral: Hip pain  Referred By: Dr. Newman  Past Medical History Relevant to Rehab: none  Family/Caregiver Present: No  General Comment: Onset Date 9/6/24, [employer] Medicare Auth required   Insurance: 6V APPROVED 10/11/2024-12/09/2024  AUTH REQUIRED, 35.00 CO PAY, 100% COVERAGE, [place]           Subjective    Pt reports 4/10 pain in left ischial tuberosity region upon arrival.  She notes that pain is worst in the mornings and gradually improves after taking OTC Tylenol.  She notes no changes since PT initial evaluation.    Precautions  STEADI Fall Risk Score (The score of 4 or more indicates an increased risk of falling): 1  Precautions Comment: None       Pain  4/10 pain in left ischial tuberosity region upon arrival  1/10 pain in left ischial tuberosity region at end of session       Objective     Treatments:  Therapeutic Exercise: (38 minutes)  - Supine Figure 4 Piriformis Stretch  3 x 30 seconds hold  - Bent Knee Fallouts  x 15 reps L/R  - Supine Active Straight Leg Raise  x 10 reps L/R  - Hip adduction/ball squeeze x 15  - Hamstring isometric x 10 right  - Bridge x 10    Sidelying:  - Clamshells x 10  L/R    Standing:  Hamstring curls x 20 L/R with 1# ankle weights  Hip abduction SLR x 15 L/R with 1# ankle weights  Hip extension SLR x 15 L/R with 1# ankle weights  Lateral step ups x 10 L/R      OP EDUCATION:  Pt to continue current PT HEP as tolerated  Access Code: [email or web address]  URL: [email or web address]  Date: 10/11/2024  Prepared by: Fara Lopez     Exercises  - Supine Figure 4 Piriformis Stretch  - 2 x daily - 7 x weekly - 3 sets - 30 seconds hold  - Bent Knee Fallouts  - 2 x daily - 7 x weekly - 2 sets - 10 reps  - Supine Active Straight Leg Raise  - 2 x daily - 7 x weekly - 2 sets - 10 reps       Goals:  Active       PT Problem       Patient to be able to walk on level surfaces x 600 feet without pain to allow for patient to walk in grocery store        Start:  10/11/24    Expected End:  11/22/24            Patient will report pain 1/10 at worst in L hip with daily activities to allow for ability to care for        Start:  10/11/24    Expected End:  11/22/24            Patient will demonstrate improved L LE strength to 4+/5 to allow for improved stability during lifting activities including transferring /assisting him with ADLs       Start:  10/11/24    Expected End:  11/22/24            Patient will demonstrate improved score on LEFS by 10 points to demonstrate improved subjective functional mobility and ability to perform daily activities.          Start:  10/11/24    Expected End:  11/22/24            Patient will demonstrate independence and compliance with safe and appropriate HEP for pain reduction, ROM/flexibility, core and BLE strength and postural correction.        Start:  10/11/24    Expected End:  11/22/24                Plan:  Continue per PT POC as tolerated  OP PT Plan  Treatment/Interventions: Cryotherapy, Education/ Instruction, Electrical stimulation, Gait training, Hot pack, Manual therapy, Therapeutic activities, Therapeutic exercises  PT Plan: Skilled  PT  PT Frequency: 2 times per week  Duration: 6 weeks  Rehab Potential: Good  Plan of Care Agreement: Patient  Plan of care discussed with patient and patient agrees with plan      Nicole Marshall, PT [phone]

## 2024-10-24 ENCOUNTER — TREATMENT (OUTPATIENT)
Dept: PHYSICAL THERAPY | Facility: CLINIC | Age: 84
End: 2024-10-24
Payer: MEDICARE

## 2024-10-24 DIAGNOSIS — R29.898 WEAKNESS OF LEFT HIP: ICD-10-CM

## 2024-10-24 DIAGNOSIS — M25.552 PAIN OF LEFT HIP: Primary | ICD-10-CM

## 2024-10-24 DIAGNOSIS — M70.62 GREATER TROCHANTERIC BURSITIS OF LEFT HIP: ICD-10-CM

## 2024-10-24 PROCEDURE — 97110 THERAPEUTIC EXERCISES: CPT | Mod: GP | Performed by: PHYSICAL THERAPIST

## 2024-10-24 NOTE — PROGRESS NOTES
"Physical Therapy                                                                                  Physical Therapy Treatment       Patient name: Loan Bella  MRN:   [id number]  Today's Date: 10/24/2024     Time Calculation  Start Time: 1334  Stop Time: 1415  Time Calculation (min): 41 min  1. Pain of left hip        2. Greater trochanteric bursitis of left hip  Follow Up In Physical Therapy      3. Weakness of left hip            PT  Visit  PT Received On: 10/24/24  Response to Previous Treatment: Patient with no complaints from previous session. (Slight discomfort for first day after)  General  Reason for Referral: Hip pain  Referred By: Dr. Newman  General Comment: Onset Date 9/6/24, [place] Medicare Auth required  6V APPROVED 10/11/2024-12/09/2024 visit 3/6  Assessment:  Patient completed session today with moderate effort and patient reported hips get tired with exercises but no increase in pain.  Patient demonstrated improvement with ability to increase reps.   Patient will continue to benefit from PT to work towards goals of decreasing hip pain.      Plan:        Monitor response to treatment and adjust plan as needed.     Subjective:  Patient reported that she woke up for the first time without \"feeling like I want to cry\".   No pain at this time.   PT  Visit  PT Received On: 10/24/24  Response to Previous Treatment: Patient with no complaints from previous session. (Slight discomfort for first day after)  Performing HEP: yes     Pain   No pain    Treatment:    Therapeutic Exercise:   41 MINUTES  Therapeutic exercises completed this date to improve strength and postural control to improve ability to perform functional activities safely.       -Sci Fit x 5 minutes level 1 resistance 8   - Supine Figure 4 Piriformis Stretch  3 x 30 seconds hold  - Bent Knee Fallouts  x 15 reps L/R  - Supine Active Straight Leg Raise  x 10 reps L/R  - Hip adduction/ball squeeze x 15  -sidelying hip abduction x 10   - Bridge x " 10     Sidelying:  - Clamshells x 15 L/R     Standing:  Hamstring curls x 20 L/R with 1# ankle weights  Hip abduction SLR x 20 L/R with 1# ankle weights  Hip extension SLR x 20 L/R with 1# ankle weights  Lateral step ups x 10 L/R  Education:   Continue with HEP     Fara Lopez, PT    Active       PT Problem       Patient to be able to walk on level surfaces x 600 feet without pain to allow for patient to walk in grocery store        Start:  10/11/24    Expected End:  11/22/24            Patient will report pain 1/10 at worst in L hip with daily activities to allow for ability to care for        Start:  10/11/24    Expected End:  11/22/24            Patient will demonstrate improved L LE strength to 4+/5 to allow for improved stability during lifting activities including transferring /assisting him with ADLs       Start:  10/11/24    Expected End:  11/22/24            Patient will demonstrate improved score on LEFS by 10 points to demonstrate improved subjective functional mobility and ability to perform daily activities.          Start:  10/11/24    Expected End:  11/22/24            Patient will demonstrate independence and compliance with safe and appropriate HEP for pain reduction, ROM/flexibility, core and BLE strength and postural correction.        Start:  10/11/24    Expected End:  11/22/24

## 2024-11-06 ENCOUNTER — TREATMENT (OUTPATIENT)
Dept: PHYSICAL THERAPY | Facility: CLINIC | Age: 84
End: 2024-11-06
Payer: MEDICARE

## 2024-11-06 DIAGNOSIS — M25.552 PAIN OF LEFT HIP: Primary | ICD-10-CM

## 2024-11-06 DIAGNOSIS — M70.62 GREATER TROCHANTERIC BURSITIS OF LEFT HIP: ICD-10-CM

## 2024-11-06 DIAGNOSIS — R29.898 WEAKNESS OF LEFT HIP: ICD-10-CM

## 2024-11-06 PROCEDURE — 97110 THERAPEUTIC EXERCISES: CPT | Mod: GP,CQ

## 2024-11-06 NOTE — PROGRESS NOTES
Physical Therapy                                                                                  Physical Therapy Treatment       Patient name: Loan Bella  MRN:   [id number]  Today's Date: 11/6/24     Time Calculation  Start Time: 1420  Stop Time: 1510  Time Calculation (min): 50 min  1. Pain of left hip        2. Greater trochanteric bursitis of left hip  Follow Up In Physical Therapy      3. Weakness of left hip               General  Reason for Referral: Hip pain  Referred By: Dr. Newman  Past Medical History Relevant to Rehab: none  Onset Date 9/6/24, [employer] Medicare Auth required  6V APPROVED 10/11/2024-12/09/2024 visit 4/6   Precautions None     Subjective:  Pt reports hip has greatly improved since beginning PTPt states that her R shoulder continues to hurt . Feels healing little by little , slow process  Response to last session:     Performing HEP: yes    Pain  2/10 L hip upon arrival   0/10 L hip  R shoulder 3-4/10 intermittent   Objective:  Reduce pain, improve flexibility and increase strength to return to pain free full functional mobility  Treatment:    Therapeutic Exercise:   50 MINUTES  Therapeutic exercises completed this date to improve strength and postural control to improve ability to perform functional activities safely.    -Sci Fit x 10 minutes level 1    - Supine Figure 4 Piriformis Stretch  3 reps  x 20 seconds hold x 3 sets   - Bent Knee Fallouts  x 20 reps L/R  - Supine Active Straight Leg Raise  x 20 reps L/R  - Hip adduction/ball squeeze x 20  -sidelying hip abduction x 10 (deferred due to hurts to lay on R shoulder)  - Bridge x 10     Standing:  Hip abduction SLR x 2 sets of 15  Lateral step ups  Hip extension SLR x 15 L/R   Ascend lifts with contralateral march lift 15 reps 15 reps L/R  Assessment: Tolerated session well without increase in pain or symptoms  Receptive to direction. Completed exercise with good form and reduced pain at end of session.  Plan:    OP PT  Plan  Treatment/Interventions: Cryotherapy, Education/ Instruction, Electrical stimulation, Gait training, Hot pack, Manual therapy, Therapeutic activities, Therapeutic exercises  PT Plan: Skilled PT  PT Frequency: 2 times per week  Duration: 6 weeks   Education:   No additions to HEP today    Melany Louis, PTA    Active       PT Problem       Patient to be able to walk on level surfaces x 600 feet without pain to allow for patient to walk in grocery store        Start:  10/11/24    Expected End:  11/22/24            Patient will report pain 1/10 at worst in L hip with daily activities to allow for ability to care for        Start:  10/11/24    Expected End:  11/22/24            Patient will demonstrate improved L LE strength to 4+/5 to allow for improved stability during lifting activities including transferring /assisting him with ADLs       Start:  10/11/24    Expected End:  11/22/24            Patient will demonstrate improved score on LEFS by 10 points to demonstrate improved subjective functional mobility and ability to perform daily activities.          Start:  10/11/24    Expected End:  11/22/24            Patient will demonstrate independence and compliance with safe and appropriate HEP for pain reduction, ROM/flexibility, core and BLE strength and postural correction.        Start:  10/11/24    Expected End:  11/22/24

## 2024-11-15 ENCOUNTER — APPOINTMENT (OUTPATIENT)
Dept: PHYSICAL THERAPY | Facility: CLINIC | Age: 84
End: 2024-11-15
Payer: MEDICARE

## 2024-11-15 ENCOUNTER — DOCUMENTATION (OUTPATIENT)
Dept: PHYSICAL THERAPY | Facility: CLINIC | Age: 84
End: 2024-11-15
Payer: MEDICARE

## 2024-11-20 ENCOUNTER — APPOINTMENT (OUTPATIENT)
Dept: PHYSICAL THERAPY | Facility: CLINIC | Age: 84
End: 2024-11-20
Payer: MEDICARE

## 2024-11-21 ENCOUNTER — APPOINTMENT (OUTPATIENT)
Dept: PHYSICAL THERAPY | Facility: CLINIC | Age: 84
End: 2024-11-21
Payer: MEDICARE

## 2024-11-26 ENCOUNTER — DOCUMENTATION (OUTPATIENT)
Dept: PHYSICAL THERAPY | Facility: CLINIC | Age: 84
End: 2024-11-26
Payer: MEDICARE

## 2024-11-26 NOTE — PROGRESS NOTES
Physical Therapy    Discharge Summary    Name: Loan Bella  MRN: [id number]  : 1940  Date: 24    Discharge Summary: PT    Discharge Information: Date of discharge 24, Date of last visit 24, Date of evaluation 10/11/24, Number of attended visits 3, Referred by Dr. Newman, and Referred for L hip     Therapy Summary: Patient was evaluated and treated secondary to L hip pain.  Patient called on 24 reporting she will return to her Physician secondary to increasing pain.       Rehab Discharge Reason: Patient requested due to see above  
none

## 2024-12-05 ENCOUNTER — APPOINTMENT (OUTPATIENT)
Dept: PHYSICAL THERAPY | Facility: CLINIC | Age: 84
End: 2024-12-05
Payer: MEDICARE